# Patient Record
Sex: MALE | Race: WHITE
[De-identification: names, ages, dates, MRNs, and addresses within clinical notes are randomized per-mention and may not be internally consistent; named-entity substitution may affect disease eponyms.]

---

## 2022-09-06 ENCOUNTER — HOSPITAL ENCOUNTER (INPATIENT)
Dept: HOSPITAL 74 - YASAS | Age: 37
LOS: 3 days | Discharge: LEFT BEFORE DISCHARGE | DRG: 770 | End: 2022-09-09
Attending: SURGERY | Admitting: ALLERGY & IMMUNOLOGY
Payer: COMMERCIAL

## 2022-09-06 VITALS — BODY MASS INDEX: 26.3 KG/M2

## 2022-09-06 DIAGNOSIS — F17.210: ICD-10-CM

## 2022-09-06 DIAGNOSIS — F10.230: Primary | ICD-10-CM

## 2022-09-06 DIAGNOSIS — F12.20: ICD-10-CM

## 2022-09-06 DIAGNOSIS — Z28.310: ICD-10-CM

## 2022-09-06 DIAGNOSIS — Z28.9: ICD-10-CM

## 2022-09-06 DIAGNOSIS — F13.20: ICD-10-CM

## 2022-09-06 DIAGNOSIS — Z91.018: ICD-10-CM

## 2022-09-06 DIAGNOSIS — F15.20: ICD-10-CM

## 2022-09-06 DIAGNOSIS — F14.20: ICD-10-CM

## 2022-09-06 PROCEDURE — U0003 INFECTIOUS AGENT DETECTION BY NUCLEIC ACID (DNA OR RNA); SEVERE ACUTE RESPIRATORY SYNDROME CORONAVIRUS 2 (SARS-COV-2) (CORONAVIRUS DISEASE [COVID-19]), AMPLIFIED PROBE TECHNIQUE, MAKING USE OF HIGH THROUGHPUT TECHNOLOGIES AS DESCRIBED BY CMS-2020-01-R: HCPCS

## 2022-09-06 PROCEDURE — U0005 INFEC AGEN DETEC AMPLI PROBE: HCPCS

## 2022-09-06 PROCEDURE — HZ2ZZZZ DETOXIFICATION SERVICES FOR SUBSTANCE ABUSE TREATMENT: ICD-10-PCS | Performed by: SURGERY

## 2022-09-06 RX ADMIN — HYDROXYZINE PAMOATE SCH MG: 25 CAPSULE ORAL at 17:47

## 2022-09-06 RX ADMIN — Medication SCH: at 22:56

## 2022-09-06 RX ADMIN — Medication SCH: at 17:50

## 2022-09-06 RX ADMIN — HYDROXYZINE PAMOATE SCH: 25 CAPSULE ORAL at 22:56

## 2022-09-07 LAB
ALBUMIN SERPL-MCNC: 3.3 G/DL (ref 3.4–5)
ALP SERPL-CCNC: 70 U/L (ref 45–117)
ALT SERPL-CCNC: 19 U/L (ref 13–61)
ANION GAP SERPL CALC-SCNC: 6 MMOL/L (ref 8–16)
AST SERPL-CCNC: 15 U/L (ref 15–37)
BILIRUB SERPL-MCNC: 0.4 MG/DL (ref 0.2–1)
BUN SERPL-MCNC: 10.2 MG/DL (ref 7–18)
CALCIUM SERPL-MCNC: 8.9 MG/DL (ref 8.5–10.1)
CHLORIDE SERPL-SCNC: 106 MMOL/L (ref 98–107)
CO2 SERPL-SCNC: 29 MMOL/L (ref 21–32)
CREAT SERPL-MCNC: 0.8 MG/DL (ref 0.55–1.3)
DEPRECATED RDW RBC AUTO: 13.1 % (ref 11.9–15.9)
GLUCOSE SERPL-MCNC: 89 MG/DL (ref 74–106)
HCT VFR BLD CALC: 41.3 % (ref 35.4–49)
HGB BLD-MCNC: 14.2 GM/DL (ref 11.7–16.9)
MCH RBC QN AUTO: 32.3 PG (ref 25.7–33.7)
MCHC RBC AUTO-ENTMCNC: 34.3 G/DL (ref 32–35.9)
MCV RBC: 94.2 FL (ref 80–96)
PLATELET # BLD AUTO: 298 10^3/UL (ref 134–434)
PMV BLD: 7.4 FL (ref 7.5–11.1)
PROT SERPL-MCNC: 6.2 G/DL (ref 6.4–8.2)
RBC # BLD AUTO: 4.38 M/MM3 (ref 4–5.6)
SODIUM SERPL-SCNC: 140 MMOL/L (ref 136–145)
WBC # BLD AUTO: 8.9 K/MM3 (ref 4–10)

## 2022-09-07 RX ADMIN — HYDROXYZINE PAMOATE SCH MG: 25 CAPSULE ORAL at 13:42

## 2022-09-07 RX ADMIN — Medication SCH MG: at 22:39

## 2022-09-07 RX ADMIN — HYDROXYZINE PAMOATE SCH MG: 25 CAPSULE ORAL at 05:58

## 2022-09-07 RX ADMIN — Medication SCH TAB: at 10:44

## 2022-09-07 RX ADMIN — METHOCARBAMOL PRN MG: 500 TABLET ORAL at 10:44

## 2022-09-07 RX ADMIN — HYDROXYZINE PAMOATE SCH MG: 25 CAPSULE ORAL at 10:44

## 2022-09-07 RX ADMIN — HYDROXYZINE PAMOATE SCH MG: 25 CAPSULE ORAL at 22:39

## 2022-09-07 RX ADMIN — HYDROXYZINE PAMOATE SCH MG: 25 CAPSULE ORAL at 17:50

## 2022-09-08 RX ADMIN — HYDROXYZINE PAMOATE SCH MG: 25 CAPSULE ORAL at 10:46

## 2022-09-08 RX ADMIN — HYDROXYZINE PAMOATE SCH MG: 25 CAPSULE ORAL at 18:23

## 2022-09-08 RX ADMIN — Medication SCH MG: at 22:39

## 2022-09-08 RX ADMIN — HYDROXYZINE PAMOATE SCH MG: 25 CAPSULE ORAL at 22:38

## 2022-09-08 RX ADMIN — HYDROXYZINE PAMOATE SCH: 25 CAPSULE ORAL at 06:41

## 2022-09-08 RX ADMIN — Medication SCH TAB: at 10:46

## 2022-09-08 RX ADMIN — HYDROXYZINE PAMOATE SCH MG: 25 CAPSULE ORAL at 14:03

## 2022-09-09 VITALS
DIASTOLIC BLOOD PRESSURE: 77 MMHG | HEART RATE: 75 BPM | SYSTOLIC BLOOD PRESSURE: 131 MMHG | RESPIRATION RATE: 17 BRPM | TEMPERATURE: 97.3 F

## 2022-09-09 RX ADMIN — METHOCARBAMOL PRN MG: 500 TABLET ORAL at 10:25

## 2022-09-09 RX ADMIN — HYDROXYZINE PAMOATE SCH: 25 CAPSULE ORAL at 06:23

## 2022-09-09 RX ADMIN — Medication SCH TAB: at 10:25

## 2022-09-09 RX ADMIN — HYDROXYZINE PAMOATE SCH MG: 25 CAPSULE ORAL at 10:25

## 2022-09-20 ENCOUNTER — HOSPITAL ENCOUNTER (INPATIENT)
Dept: HOSPITAL 74 - YASAS | Age: 37
LOS: 2 days | Discharge: LEFT BEFORE DISCHARGE | DRG: 770 | End: 2022-09-22
Attending: INTERNAL MEDICINE | Admitting: ALLERGY & IMMUNOLOGY
Payer: COMMERCIAL

## 2022-09-20 VITALS — BODY MASS INDEX: 25.2 KG/M2

## 2022-09-20 DIAGNOSIS — Z28.310: ICD-10-CM

## 2022-09-20 DIAGNOSIS — F19.280: ICD-10-CM

## 2022-09-20 DIAGNOSIS — F14.20: ICD-10-CM

## 2022-09-20 DIAGNOSIS — R76.11: ICD-10-CM

## 2022-09-20 DIAGNOSIS — F32.A: ICD-10-CM

## 2022-09-20 DIAGNOSIS — F17.210: ICD-10-CM

## 2022-09-20 DIAGNOSIS — F19.282: ICD-10-CM

## 2022-09-20 DIAGNOSIS — F13.20: ICD-10-CM

## 2022-09-20 DIAGNOSIS — Z28.9: ICD-10-CM

## 2022-09-20 DIAGNOSIS — F39: ICD-10-CM

## 2022-09-20 DIAGNOSIS — F10.230: Primary | ICD-10-CM

## 2022-09-20 DIAGNOSIS — F15.20: ICD-10-CM

## 2022-09-20 PROCEDURE — HZ2ZZZZ DETOXIFICATION SERVICES FOR SUBSTANCE ABUSE TREATMENT: ICD-10-PCS | Performed by: SURGERY

## 2022-09-20 PROCEDURE — U0003 INFECTIOUS AGENT DETECTION BY NUCLEIC ACID (DNA OR RNA); SEVERE ACUTE RESPIRATORY SYNDROME CORONAVIRUS 2 (SARS-COV-2) (CORONAVIRUS DISEASE [COVID-19]), AMPLIFIED PROBE TECHNIQUE, MAKING USE OF HIGH THROUGHPUT TECHNOLOGIES AS DESCRIBED BY CMS-2020-01-R: HCPCS

## 2022-09-20 PROCEDURE — U0005 INFEC AGEN DETEC AMPLI PROBE: HCPCS

## 2022-09-20 RX ADMIN — METHOCARBAMOL PRN MG: 500 TABLET ORAL at 17:32

## 2022-09-20 RX ADMIN — NICOTINE SCH: 14 PATCH, EXTENDED RELEASE TRANSDERMAL at 18:26

## 2022-09-20 RX ADMIN — Medication SCH: at 18:26

## 2022-09-20 RX ADMIN — HYDROXYZINE PAMOATE SCH MG: 25 CAPSULE ORAL at 17:30

## 2022-09-21 LAB
ALBUMIN SERPL-MCNC: 3.4 G/DL (ref 3.4–5)
ALP SERPL-CCNC: 71 U/L (ref 45–117)
ALT SERPL-CCNC: 20 U/L (ref 13–61)
ANION GAP SERPL CALC-SCNC: 5 MMOL/L (ref 8–16)
AST SERPL-CCNC: 10 U/L (ref 15–37)
BILIRUB SERPL-MCNC: 0.2 MG/DL (ref 0.2–1)
BUN SERPL-MCNC: 13.2 MG/DL (ref 7–18)
CALCIUM SERPL-MCNC: 9.4 MG/DL (ref 8.5–10.1)
CHLORIDE SERPL-SCNC: 105 MMOL/L (ref 98–107)
CO2 SERPL-SCNC: 31 MMOL/L (ref 21–32)
CREAT SERPL-MCNC: 1.1 MG/DL (ref 0.55–1.3)
DEPRECATED RDW RBC AUTO: 13.3 % (ref 11.9–15.9)
GLUCOSE SERPL-MCNC: 78 MG/DL (ref 74–106)
HCT VFR BLD CALC: 41.2 % (ref 35.4–49)
HGB BLD-MCNC: 14 GM/DL (ref 11.7–16.9)
MCH RBC QN AUTO: 32.1 PG (ref 25.7–33.7)
MCHC RBC AUTO-ENTMCNC: 34.1 G/DL (ref 32–35.9)
MCV RBC: 94.1 FL (ref 80–96)
PLATELET # BLD AUTO: 331 10^3/UL (ref 134–434)
PMV BLD: 7.4 FL (ref 7.5–11.1)
PROT SERPL-MCNC: 6.3 G/DL (ref 6.4–8.2)
RBC # BLD AUTO: 4.37 M/MM3 (ref 4–5.6)
SODIUM SERPL-SCNC: 141 MMOL/L (ref 136–145)
WBC # BLD AUTO: 7.9 K/MM3 (ref 4–10)

## 2022-09-21 RX ADMIN — NICOTINE SCH: 14 PATCH, EXTENDED RELEASE TRANSDERMAL at 10:03

## 2022-09-21 RX ADMIN — METHOCARBAMOL PRN MG: 500 TABLET ORAL at 17:42

## 2022-09-21 RX ADMIN — Medication SCH TAB: at 10:02

## 2022-09-21 RX ADMIN — HYDROXYZINE PAMOATE SCH MG: 25 CAPSULE ORAL at 06:18

## 2022-09-21 RX ADMIN — HYDROXYZINE PAMOATE SCH: 25 CAPSULE ORAL at 00:17

## 2022-09-21 RX ADMIN — Medication SCH: at 23:52

## 2022-09-21 RX ADMIN — Medication SCH: at 00:17

## 2022-09-22 VITALS — DIASTOLIC BLOOD PRESSURE: 57 MMHG | HEART RATE: 80 BPM | SYSTOLIC BLOOD PRESSURE: 109 MMHG | TEMPERATURE: 97.3 F

## 2022-09-22 VITALS — RESPIRATION RATE: 18 BRPM

## 2022-09-22 RX ADMIN — METHOCARBAMOL PRN MG: 500 TABLET ORAL at 10:26

## 2022-09-22 RX ADMIN — Medication SCH TAB: at 10:26

## 2022-09-22 RX ADMIN — NICOTINE SCH: 14 PATCH, EXTENDED RELEASE TRANSDERMAL at 10:28

## 2022-11-25 ENCOUNTER — HOSPITAL ENCOUNTER (INPATIENT)
Dept: HOSPITAL 74 - YASAS | Age: 37
LOS: 2 days | Discharge: LEFT BEFORE DISCHARGE | DRG: 770 | End: 2022-11-27
Attending: SURGERY | Admitting: ALLERGY & IMMUNOLOGY
Payer: COMMERCIAL

## 2022-11-25 VITALS — BODY MASS INDEX: 25.3 KG/M2

## 2022-11-25 DIAGNOSIS — Z91.013: ICD-10-CM

## 2022-11-25 DIAGNOSIS — J45.20: ICD-10-CM

## 2022-11-25 DIAGNOSIS — F32.A: ICD-10-CM

## 2022-11-25 DIAGNOSIS — R76.11: ICD-10-CM

## 2022-11-25 DIAGNOSIS — F12.20: ICD-10-CM

## 2022-11-25 DIAGNOSIS — F10.230: Primary | ICD-10-CM

## 2022-11-25 DIAGNOSIS — F15.20: ICD-10-CM

## 2022-11-25 DIAGNOSIS — F14.20: ICD-10-CM

## 2022-11-25 DIAGNOSIS — F17.210: ICD-10-CM

## 2022-11-25 PROCEDURE — U0005 INFEC AGEN DETEC AMPLI PROBE: HCPCS

## 2022-11-25 PROCEDURE — HZ2ZZZZ DETOXIFICATION SERVICES FOR SUBSTANCE ABUSE TREATMENT: ICD-10-PCS | Performed by: SURGERY

## 2022-11-25 PROCEDURE — U0003 INFECTIOUS AGENT DETECTION BY NUCLEIC ACID (DNA OR RNA); SEVERE ACUTE RESPIRATORY SYNDROME CORONAVIRUS 2 (SARS-COV-2) (CORONAVIRUS DISEASE [COVID-19]), AMPLIFIED PROBE TECHNIQUE, MAKING USE OF HIGH THROUGHPUT TECHNOLOGIES AS DESCRIBED BY CMS-2020-01-R: HCPCS

## 2022-11-25 RX ADMIN — METHOCARBAMOL PRN MG: 500 TABLET ORAL at 17:31

## 2022-11-25 RX ADMIN — Medication SCH TAB: at 17:30

## 2022-11-25 RX ADMIN — Medication SCH MG: at 22:59

## 2022-11-25 RX ADMIN — HYDROXYZINE PAMOATE PRN MG: 25 CAPSULE ORAL at 17:30

## 2022-11-26 VITALS — SYSTOLIC BLOOD PRESSURE: 124 MMHG | HEART RATE: 86 BPM | DIASTOLIC BLOOD PRESSURE: 82 MMHG | TEMPERATURE: 96.8 F

## 2022-11-26 VITALS — RESPIRATION RATE: 18 BRPM

## 2022-11-26 RX ADMIN — Medication SCH MG: at 22:37

## 2022-11-26 RX ADMIN — METHOCARBAMOL PRN MG: 500 TABLET ORAL at 10:34

## 2022-11-26 RX ADMIN — Medication SCH TAB: at 10:34

## 2022-11-26 RX ADMIN — HYDROXYZINE PAMOATE PRN MG: 25 CAPSULE ORAL at 10:34

## 2022-11-27 RX ADMIN — Medication SCH: at 10:31

## 2024-01-16 ENCOUNTER — HOSPITAL ENCOUNTER (INPATIENT)
Dept: HOSPITAL 74 - YASAS | Age: 39
LOS: 1 days | Discharge: LEFT BEFORE DISCHARGE | DRG: 770 | End: 2024-01-17
Attending: ALLERGY & IMMUNOLOGY | Admitting: ALLERGY & IMMUNOLOGY
Payer: COMMERCIAL

## 2024-01-16 VITALS — TEMPERATURE: 98.3 F | DIASTOLIC BLOOD PRESSURE: 76 MMHG | HEART RATE: 87 BPM | SYSTOLIC BLOOD PRESSURE: 119 MMHG

## 2024-01-16 VITALS — BODY MASS INDEX: 23.6 KG/M2

## 2024-01-16 VITALS — RESPIRATION RATE: 17 BRPM

## 2024-01-16 DIAGNOSIS — F14.20: ICD-10-CM

## 2024-01-16 DIAGNOSIS — Z28.310: ICD-10-CM

## 2024-01-16 DIAGNOSIS — F10.230: Primary | ICD-10-CM

## 2024-01-16 DIAGNOSIS — Z28.9: ICD-10-CM

## 2024-01-16 DIAGNOSIS — J45.20: ICD-10-CM

## 2024-01-16 DIAGNOSIS — F17.210: ICD-10-CM

## 2024-01-16 DIAGNOSIS — Z86.11: ICD-10-CM

## 2024-01-16 DIAGNOSIS — F13.10: ICD-10-CM

## 2024-01-16 DIAGNOSIS — F12.10: ICD-10-CM

## 2024-01-16 PROCEDURE — HZ2ZZZZ DETOXIFICATION SERVICES FOR SUBSTANCE ABUSE TREATMENT: ICD-10-PCS | Performed by: ALLERGY & IMMUNOLOGY

## 2024-01-16 RX ADMIN — Medication SCH TAB: at 15:03

## 2024-01-17 RX ADMIN — Medication SCH: at 10:47

## 2024-08-11 ENCOUNTER — EMERGENCY (EMERGENCY)
Facility: HOSPITAL | Age: 39
LOS: 1 days | Discharge: HOME | End: 2024-08-11
Admitting: EMERGENCY MEDICINE
Payer: SELF-PAY

## 2024-08-11 VITALS
TEMPERATURE: 99 F | HEIGHT: 66 IN | RESPIRATION RATE: 18 BRPM | OXYGEN SATURATION: 100 % | SYSTOLIC BLOOD PRESSURE: 115 MMHG | DIASTOLIC BLOOD PRESSURE: 86 MMHG | HEART RATE: 76 BPM | WEIGHT: 160.06 LBS

## 2024-08-11 DIAGNOSIS — M79.671 PAIN IN RIGHT FOOT: ICD-10-CM
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
## 2024-08-11 DIAGNOSIS — M25.571 PAIN IN RIGHT ANKLE AND JOINTS OF RIGHT FOOT: ICD-10-CM

## 2024-08-11 DIAGNOSIS — M54.9 DORSALGIA, UNSPECIFIED: ICD-10-CM
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
## 2024-08-11 PROCEDURE — 99053 MED SERV 10PM-8AM 24 HR FAC: CPT

## 2024-08-11 PROCEDURE — 73610 X-RAY EXAM OF ANKLE: CPT | Mod: 26,RT

## 2024-08-11 PROCEDURE — 72100 X-RAY EXAM L-S SPINE 2/3 VWS: CPT

## 2024-08-11 PROCEDURE — 73630 X-RAY EXAM OF FOOT: CPT

## 2024-08-11 PROCEDURE — 99284 EMERGENCY DEPT VISIT MOD MDM: CPT

## 2024-08-11 PROCEDURE — 73610 X-RAY EXAM OF ANKLE: CPT

## 2024-08-11 PROCEDURE — 99284 EMERGENCY DEPT VISIT MOD MDM: CPT | Mod: 25

## 2024-08-11 PROCEDURE — 72100 X-RAY EXAM L-S SPINE 2/3 VWS: CPT | Mod: 26

## 2024-08-11 PROCEDURE — 73630 X-RAY EXAM OF FOOT: CPT | Mod: 26,RT

## 2024-08-11 RX ORDER — ACETAMINOPHEN 500 MG
1000 TABLET ORAL ONCE
Refills: 0 | Status: COMPLETED | OUTPATIENT
Start: 2024-08-11 | End: 2024-08-11

## 2024-08-11 RX ORDER — IBUPROFEN 200 MG
600 TABLET ORAL ONCE
Refills: 0 | Status: COMPLETED | OUTPATIENT
Start: 2024-08-11 | End: 2024-08-11

## 2024-08-11 RX ADMIN — Medication 600 MILLIGRAM(S): at 08:03

## 2024-08-11 RX ADMIN — Medication 1000 MILLIGRAM(S): at 08:03

## 2024-08-11 NOTE — ED PROVIDER NOTE - PATIENT PORTAL LINK FT
You can access the FollowMyHealth Patient Portal offered by [facility] by registering at the following website: [email or web address]. By joining [employer]’s FollowMyHealth portal, you will also be able to view your health information using other applications (apps) compatible with our system.

## 2024-08-11 NOTE — ED PROVIDER NOTE - OBJECTIVE STATEMENT
The pt is a 38 y/o M, who presents to ED c/o R foot/ankle pain s/p fall 2 d ago. Pt states that missed a step and fell, pain to r ankle/foot, pain w/walking, has not taken any pain meds, hx of prior surg to ankle, also c/o L lbp -- states "twisted it when fell". Denies head trauma, loc, cp, sob, numbness or tingling to toes, knee or leg pain, bowel/bladder incontinence.

## 2024-08-11 NOTE — ED PROVIDER NOTE - NSFOLLOWUPINSTRUCTIONS_ED_ALL_ED_FT
RICE Therapy for Routine Care of Injuries  Many injuries can be cared for with rest, ice, compression, and elevation. This is also called RICE therapy.  RICE therapy includes:  Resting the injured body part.  Putting ice on the injury.  Putting pressure on the injury. This is also called compression.  Raising the injured part. This is also called elevation.  RICE therapy can help reduce pain and swelling.  Supplies needed:  Ice.  Plastic bag.  Towel.  Elastic bandage.  Pillow or pillows to raise the injured body part.  How to care for your injury with RICE therapy  Rest  Try to rest the injured part of your body.  You can go back to your normal activities when your health care provider says it's okay to do them and when you can do them without pain.  Ask what things are safe for you to do.  Some injuries heal better with early movement instead of resting. If you rest the injury too much, it may not heal as well. Ask your provider if you should do exercises to help your injury get better.  Ice  Bag of ice on a towel on the skin.  Putting ice on your injury can help to lessen swelling and pain. Do not apply ice directly to your skin. Use ice on as many days as told by your provider.  If told, put ice on the area.  Put ice in a plastic bag.  Place a towel between your skin and the bag.  Leave the ice on for 20 minutes, 2–3 times a day.  If your skin turns bright red, take off the ice right away to prevent skin damage. The risk of damage is higher if you can't feel pain, heat, or cold.  Compression  A person wrapping a bandage around an ankle.  Put pressure, also called compression, on your injured area. This can be done with an elastic bandage. If this type of bandage has been put on your injury:  Follow instructions on the package the bandage came in about how to use it.  Do not wrap the bandage too tightly.  Wrap the bandage more loosely if part of your body beyond the bandage looks blue, or is swollen, cold, painful, or loses feeling.  Take off the bandage and put it on again every 3–4 hours or as told by your provider.  Call your provider if the bandage seems to make your injury worse.  Elevation  Raise the injured area above the level of your heart while you're sitting or lying down. Use a pillow to support your injured area as needed.  Follow these instructions at home:  If your symptoms get worse or last a long time, make a follow-up appointment with your provider. You may need to have imaging tests, such as X-rays or an MRI.  If you have imaging tests, ask how to get your results when they are ready.  Contact a health care provider if:  You keep having pain and swelling.  Your symptoms get worse.  Get help right away if:  You have sudden, very bad pain at your injury or lower than your injury.  You have tingling or numbness at your injury or lower than your injury, and it does not go away when you take the bandage off.

## 2024-08-11 NOTE — ED PROVIDER NOTE - CARE PROVIDER_API CALL
Gera Roa  Orthopaedic Surgery  [address] [place] [address], [address]  [place] [address]  Phone: [phone]  Fax: [phone]  Follow Up Time:

## 2024-08-11 NOTE — ED PROVIDER NOTE - CLINICAL SUMMARY MEDICAL DECISION MAKING FREE TEXT BOX
pt c/o r foot/ankle pain s/p fall 2 d ago, also c/o lbp due to "twisting", has had prior ORIF of r ankle, also spinal surg in past, pt nad, neurovascular intact, no direct bony tend, ambulatory w/limp, xrays                   , given ibuprofen/tyl for pain, stable for dc, to RICE and con't prn ibuprofen, f/u w/ortho pt c/o r foot/ankle pain s/p fall 2 d ago, also c/o lbp due to "twisting", has had prior ORIF of r ankle, also spinal surg in past, pt nad, neurovascular intact, no direct bony tend, ambulatory w/limp, xrays neg for acute fx (confirmed w/radiology), given ibuprofen/tyl for pain, placed in hard sole shoe and cane given for ambulatory support. stable for dc, to RICE and con't prn ibuprofen, f/u w/ortho

## 2024-08-11 NOTE — ED PROVIDER NOTE - MUSCULOSKELETAL, MLM
no C/T/LS spine tend, + old/healed lower lumbar scar, FROM, + L para lumbar tend; R LE: no knee tend, no tib/fib tend, + old/healed scar over medial malleolus, + swelling to ankle, no direct bony tend, achilles tendon intact, pedal pulse 2+, no direct bony tend over metatarsals , FROM

## 2024-08-14 ENCOUNTER — EMERGENCY (EMERGENCY)
Facility: HOSPITAL | Age: 39
LOS: 1 days | Discharge: HOME | End: 2024-08-14
Admitting: EMERGENCY MEDICINE
Payer: SELF-PAY

## 2024-08-14 VITALS
SYSTOLIC BLOOD PRESSURE: 109 MMHG | DIASTOLIC BLOOD PRESSURE: 64 MMHG | HEART RATE: 110 BPM | HEIGHT: 66 IN | OXYGEN SATURATION: 95 % | WEIGHT: 149.91 LBS | RESPIRATION RATE: 18 BRPM | TEMPERATURE: 98 F

## 2024-08-14 DIAGNOSIS — F43.20 ADJUSTMENT DISORDER, UNSPECIFIED: ICD-10-CM

## 2024-08-14 DIAGNOSIS — F12.90 CANNABIS USE, UNSPECIFIED, UNCOMPLICATED: ICD-10-CM

## 2024-08-14 DIAGNOSIS — Z76.5 MALINGERER [CONSCIOUS SIMULATION]: ICD-10-CM

## 2024-08-14 PROCEDURE — 90792 PSYCH DIAG EVAL W/MED SRVCS: CPT

## 2024-08-14 PROCEDURE — 99284 EMERGENCY DEPT VISIT MOD MDM: CPT

## 2024-08-16 DIAGNOSIS — Z91.018 ALLERGY TO OTHER FOODS: ICD-10-CM

## 2024-08-16 DIAGNOSIS — M25.571 PAIN IN RIGHT ANKLE AND JOINTS OF RIGHT FOOT: ICD-10-CM

## 2024-08-16 DIAGNOSIS — F12.90 CANNABIS USE, UNSPECIFIED, UNCOMPLICATED: ICD-10-CM

## 2024-08-16 DIAGNOSIS — Z91.013 ALLERGY TO SEAFOOD: ICD-10-CM

## 2024-08-16 DIAGNOSIS — R45.851 SUICIDAL IDEATIONS: ICD-10-CM

## 2024-08-16 DIAGNOSIS — F43.20 ADJUSTMENT DISORDER, UNSPECIFIED: ICD-10-CM

## 2024-08-16 DIAGNOSIS — Z76.5 MALINGERER [CONSCIOUS SIMULATION]: ICD-10-CM

## 2024-08-29 ENCOUNTER — EMERGENCY (EMERGENCY)
Facility: HOSPITAL | Age: 39
LOS: 1 days | Discharge: HOME | End: 2024-08-29
Attending: EMERGENCY MEDICINE | Admitting: EMERGENCY MEDICINE
Payer: SELF-PAY

## 2024-08-29 VITALS
DIASTOLIC BLOOD PRESSURE: 61 MMHG | HEART RATE: 93 BPM | OXYGEN SATURATION: 99 % | WEIGHT: 154.98 LBS | HEIGHT: 66 IN | RESPIRATION RATE: 17 BRPM | SYSTOLIC BLOOD PRESSURE: 118 MMHG | TEMPERATURE: 98 F

## 2024-08-29 PROCEDURE — 99284 EMERGENCY DEPT VISIT MOD MDM: CPT

## 2024-08-29 PROCEDURE — 99283 EMERGENCY DEPT VISIT LOW MDM: CPT

## 2024-08-29 RX ORDER — ACETAMINOPHEN 325 MG/1
650 TABLET ORAL ONCE
Refills: 0 | Status: COMPLETED | OUTPATIENT
Start: 2024-08-29 | End: 2024-08-29

## 2024-08-29 RX ORDER — IBUPROFEN 600 MG
600 TABLET ORAL ONCE
Refills: 0 | Status: COMPLETED | OUTPATIENT
Start: 2024-08-29 | End: 2024-08-29

## 2024-08-29 RX ADMIN — Medication 600 MILLIGRAM(S): at 14:08

## 2024-08-29 RX ADMIN — ACETAMINOPHEN 650 MILLIGRAM(S): 325 TABLET ORAL at 14:07

## 2024-08-29 NOTE — ED PROVIDER NOTE - NSFOLLOWUPINSTRUCTIONS_ED_ALL_ED_FT
Can take tylenol 650mg AND/OR motrin 600mg (May cause stomach issues - take with food and avoid prolonged use) every 6hrs as needed for pain.    Follow up with primary doctor within 1-2 days.    Return to ER immediately for fevers, persistent vomit, uncontrolled pain, incontinence, focal weakness/numbness, worsening dizziness.     Follow up with podiatrist for further management of your chronic ankle/foot pain. Can call [phone] to schedule appointment.     Follow up with spine specialist for persistent pain.   Can call [phone] ([phone]) to schedule appointment or go online [email or web address]    Back Pain    Back pain is very common in adults. The cause of back pain is rarely dangerous and the pain often gets better over time. The cause of your back pain may not be known and may include strain of muscles or ligaments, degeneration of the spinal disks, or arthritis. Occasionally the pain may radiate down your leg(s). Over-the-counter medicines to reduce pain and inflammation are often the most helpful. Stretching and remaining active frequently helps the healing process.     SEEK IMMEDIATE MEDICAL CARE IF YOU HAVE ANY OF THE FOLLOWING SYMPTOMS: bowel or bladder control problems, unusual weakness or numbness in your arms or legs, nausea or vomiting, abdominal pain, fever, dizziness/lightheadedness.

## 2024-08-29 NOTE — ED PROVIDER NOTE - OBJECTIVE STATEMENT
39M PMH depression/bipolar, cannabis use, chronic R ankle and lower back pain p/w pain. Pt states that he [place] walking on beach 2d [place] and since then he has pain/swelling to R ankle. Also notes chronic pain to b/l lower back. No other systemic symptoms. Requesting cane - states he [place] his on the train. Pt has several recent visits for back/R ankle pain. XR R foot/ankle/L spine (8/11/24) "IMPRESSION: Chronic comminuted calcaneal fracture with flattening of Boehler's angle and reactive sclerosis. Subsequent subtalar arthrosis. Chronic avulsion fracture from the dorsal talus. Mild talonavicular arthrosis. No acute fluid or ankle fracture. L5 through pelvic effusion with disc graft at L5-S1. The hardware appears intact. The remainder of the lumbar spine is intact." At last visit pt also c/o SI when he was told he was being discharge, concern for malingering. Was given hard sole shoe on prior visit as well.    Denies any focal weakness, focal numbness, dysuria, hematuria, urinary frequency/urgency, incontinence, f/c, SOB, CP, abd pain, nausea, vomiting, diarrhea, recent unexplained weight loss, night sweats, rhinorrhea, cough. No hx IVDU.

## 2024-08-29 NOTE — ED ADULT NURSE NOTE - OBJECTIVE STATEMENT
39yoM came to ED c/o R ankle pain, Pt states he has chronic R ankle pain. [place] and the pain has been getting worse. Pt denies taking any medication prior to coming. No other medical complaints at this time.

## 2024-08-29 NOTE — ED PROVIDER NOTE - PHYSICAL EXAMINATION
old scar to R ankle, mild ankle swelling - prior notes indicate swelling as well.  FROM ankle/toes/knee. No spinal ttp, neck FROM. Strength 5/5. No bony ttp, FROM all extremities. Normal equal distal pulses. Steady unassisted gait. normal cap refill.  No crepitus, firmness, induration, fluctuance. Skin is normal temp. No erythema/warmth. No obvious skin breaks.     Physical Exam:    CONSTITUTIONAL:  Generally well appearing, no acute distress, alert, awake and oriented  HEENT:  Moist mucous membranes, normal voice  PULM:  No accessory muscle use, speaking full sentences  SKIN:  Normal in appearance, normal color

## 2024-08-29 NOTE — ED ADULT TRIAGE NOTE - CHIEF COMPLAINT QUOTE
"I am having pain and swelling to my right foot 2 weeks and I have had surgery in the past but I don't know if it is infected and I have back pain".

## 2024-08-29 NOTE — ED ADULT NURSE NOTE - NSHOSCREENINGQ1_ED_ALL_ED
No
You can access the FollowMyHealth Patient Portal offered by [facility] by registering at the following website: [email or web address]. By joining [employer]’s FollowMyHealth portal, you will also be able to view your health information using other applications (apps) compatible with our system.

## 2024-08-29 NOTE — ED PROVIDER NOTE - PATIENT PORTAL LINK FT
You can access the FollowMyHealth Patient Portal offered by [facility] by registering at the following website: [email or web address]. By joining [employer]’s FollowMyHealth portal, you will also be able to view your health information using other applications (apps) compatible with our system.

## 2024-08-29 NOTE — ED PROVIDER NOTE - CLINICAL SUMMARY MEDICAL DECISION MAKING FREE TEXT BOX
39M PMH depression/bipolar, cannabis use, chronic R ankle and lower back pain p/w pain. Pt states that he was walking on beach 2d ago and since then he has pain/swelling to R ankle. Also notes chronic pain to b/l lower back. No other systemic symptoms. Requesting cane - states he [place] his [place] the train. Pt has several recent visits for back/R ankle pain. XR R foot/ankle/L spine (8/11/24) "IMPRESSION: Chronic comminuted calcaneal fracture with flattening of Boehler's angle and reactive sclerosis. Subsequent subtalar arthrosis. Chronic avulsion fracture from the dorsal talus. Mild talonavicular arthrosis. No acute fluid or ankle fracture. L5 through pelvic effusion with disc graft at L5-S1. The hardware appears intact. The remainder of the lumbar spine is intact." At last visit pt also c/o SI when he was told he was being discharge, concern for malingering. Was given hard sole shoe on prior visit as well.    Vitals wnl, exam as above.   Ankle pain: Likely related to chronic injury. Clinically no new fx, not septic joint/infectious.   Back pain: Chronic, clinically not cord compression or infectious.   Discussed importance of outpt follow up and return precautions. Clinically no indication for further emergent ED workup or hospitalization at this time. Stable for dc, outpt f/u.   Given cane.

## 2024-09-01 DIAGNOSIS — Z91.018 ALLERGY TO OTHER FOODS: ICD-10-CM

## 2024-09-01 DIAGNOSIS — Z91.013 ALLERGY TO SEAFOOD: ICD-10-CM

## 2024-09-01 DIAGNOSIS — M54.50 LOW BACK PAIN, UNSPECIFIED: ICD-10-CM

## 2024-09-01 DIAGNOSIS — F31.9 BIPOLAR DISORDER, UNSPECIFIED: ICD-10-CM

## 2024-09-01 DIAGNOSIS — M25.571 PAIN IN RIGHT ANKLE AND JOINTS OF RIGHT FOOT: ICD-10-CM

## 2024-09-01 DIAGNOSIS — G89.29 OTHER CHRONIC PAIN: ICD-10-CM

## 2025-09-07 ENCOUNTER — EMERGENCY (EMERGENCY)
Facility: HOSPITAL | Age: 40
LOS: 1 days | End: 2025-09-07
Attending: STUDENT IN AN ORGANIZED HEALTH CARE EDUCATION/TRAINING PROGRAM | Admitting: STUDENT IN AN ORGANIZED HEALTH CARE EDUCATION/TRAINING PROGRAM
Payer: COMMERCIAL

## 2025-09-07 VITALS
RESPIRATION RATE: 18 BRPM | WEIGHT: 149.91 LBS | OXYGEN SATURATION: 100 % | DIASTOLIC BLOOD PRESSURE: 85 MMHG | TEMPERATURE: 98 F | SYSTOLIC BLOOD PRESSURE: 134 MMHG | HEART RATE: 77 BPM

## 2025-09-07 DIAGNOSIS — F16.10 HALLUCINOGEN ABUSE, UNCOMPLICATED: ICD-10-CM

## 2025-09-07 PROCEDURE — 99283 EMERGENCY DEPT VISIT LOW MDM: CPT

## 2025-09-07 PROCEDURE — 99284 EMERGENCY DEPT VISIT MOD MDM: CPT

## 2025-09-10 DIAGNOSIS — F16.10 HALLUCINOGEN ABUSE, UNCOMPLICATED: ICD-10-CM

## 2025-09-10 DIAGNOSIS — Z91.018 ALLERGY TO OTHER FOODS: ICD-10-CM
